# Patient Record
Sex: MALE | Race: BLACK OR AFRICAN AMERICAN | HISPANIC OR LATINO
[De-identification: names, ages, dates, MRNs, and addresses within clinical notes are randomized per-mention and may not be internally consistent; named-entity substitution may affect disease eponyms.]

---

## 2022-11-08 PROBLEM — Z00.00 ENCOUNTER FOR PREVENTIVE HEALTH EXAMINATION: Status: ACTIVE | Noted: 2022-11-08

## 2022-11-09 ENCOUNTER — NON-APPOINTMENT (OUTPATIENT)
Age: 22
End: 2022-11-09

## 2022-11-09 ENCOUNTER — APPOINTMENT (OUTPATIENT)
Dept: OTOLARYNGOLOGY | Facility: CLINIC | Age: 22
End: 2022-11-09

## 2022-11-09 VITALS
SYSTOLIC BLOOD PRESSURE: 137 MMHG | WEIGHT: 185 LBS | BODY MASS INDEX: 22.53 KG/M2 | HEIGHT: 76 IN | HEART RATE: 81 BPM | DIASTOLIC BLOOD PRESSURE: 81 MMHG | TEMPERATURE: 97.3 F

## 2022-11-09 DIAGNOSIS — S09.93XA UNSPECIFIED INJURY OF FACE, INITIAL ENCOUNTER: ICD-10-CM

## 2022-11-09 PROCEDURE — 99204 OFFICE O/P NEW MOD 45 MIN: CPT | Mod: 25

## 2022-11-09 PROCEDURE — 31231 NASAL ENDOSCOPY DX: CPT

## 2022-11-09 NOTE — DATA REVIEWED
[de-identified] : CT sinus report [facility] radiology reviewed: mildly depressed right anterior maxillary sinus fracture

## 2022-11-09 NOTE — ASSESSMENT
[FreeTextEntry1] : Mr. NJ is a 22 year male s/p R maxillary floor fracture 10/29/22. On scope no blood seen [facility]\par \par - we discussed that the numbness he is experiencing could last up to 6 months, as he is starting to have some improvement in symptoms he may resolve completely\par - can start moisturization with nasal saline spray\par - interested in custom facial mask for bball, info given for Dr. Armstrong as she is the only one I would know who could do that\par

## 2022-11-09 NOTE — PROCEDURE
[FreeTextEntry6] : reason for exam: anterior rhinoscopy insufficient for symptom evaluation \par \par Fiberoptic nasal endoscopy was performed.  R/b/a of procedure was explained to the patient and they agreed to proceed with procedure.  Nasal cavities were treated with afrin and lidocaine prior to nasal endoscopy to make the patient more comfortable.  normal mucosa, normal b/l inferior, middle and superior turbinates, inferior, middle and superior meati and sphenoethmoidal recess.  No nasal polyps or blood clots  Septum midline\par \par scope #: 27\par \par

## 2022-11-09 NOTE — CONSULT LETTER
[Dear  ___] : Dear  [unfilled], [Consult Letter:] : I had the pleasure of evaluating your patient, [unfilled]. [Sincerely,] : Sincerely, [FreeTextEntry3] : Isabelle Mullen MD\par Otolaryngology- Facial Plastics \par [address]\[place] [address]\par (P) - [phone]\par (F) - [phone]\par

## 2022-11-09 NOTE — HISTORY OF PRESENT ILLNESS
[de-identified] : Mr. NJ is a 22 year male s/p elbow to face 10/29/22 while playing basketball, xray at  was negative, saw PCP last week and was sent for CT scan due persistent numbness R cheek to mouth \par - pt suppled report from [facility] and reports fracture R max sinus anterior wall\par - denies epistaxis, nasal congestion or obstruction\par

## 2022-11-09 NOTE — PHYSICAL EXAM
[Nasal Endoscopy Performed] : nasal endoscopy was performed, see procedure section for findings [Normal] : no abnormal secretions [de-identified] : no facial deformity

## 2022-11-09 NOTE — END OF VISIT
[FreeTextEntry3] : I personally saw and examined ÁNGEL NJ in detail.  I spoke to MARVIN Lopes regarding the assessment and plan of care. I performed the procedures and relevant physical exam.  I have reviewed the above assessment and plan of care and I agree.  I have made changes to the body of the note wherever necessary and appropriate.\par

## 2022-11-14 ENCOUNTER — NON-APPOINTMENT (OUTPATIENT)
Age: 22
End: 2022-11-14